# Patient Record
Sex: MALE | Race: WHITE
[De-identification: names, ages, dates, MRNs, and addresses within clinical notes are randomized per-mention and may not be internally consistent; named-entity substitution may affect disease eponyms.]

---

## 2017-12-14 ENCOUNTER — HOSPITAL ENCOUNTER (EMERGENCY)
Dept: HOSPITAL 74 - FER | Age: 62
Discharge: HOME | End: 2017-12-14
Payer: COMMERCIAL

## 2017-12-14 VITALS — BODY MASS INDEX: 33.7 KG/M2

## 2017-12-14 VITALS — SYSTOLIC BLOOD PRESSURE: 146 MMHG | DIASTOLIC BLOOD PRESSURE: 83 MMHG | HEART RATE: 73 BPM | TEMPERATURE: 98.6 F

## 2017-12-14 DIAGNOSIS — M75.92: Primary | ICD-10-CM

## 2017-12-14 PROCEDURE — 3E0233Z INTRODUCTION OF ANTI-INFLAMMATORY INTO MUSCLE, PERCUTANEOUS APPROACH: ICD-10-PCS

## 2017-12-14 NOTE — PDOC
History of Present Illness





<Will Berman - Last Filed: 12/14/17 18:28>





- General


History Source: Patient


Exam Limitations: No Limitations





- History of Present Illness


Initial Comments: 





12/14/17 19:09


62 year old RHD male with no significant PMH, who presents to the emergency 

room complaining of 4 days of progressively worsening right shoulder pain that 

radiates to the right side of the neck. The shoulder pain is preventing him 

from sleeping at night . The neck pain is exacerbated when nodding his head. 

The patient states that he visited his PCP earlier in the week who prescribed 

ibuprofen 800mg, percocet, a muscle relaxer, and a shot in the lower back which 

provided no relief. The patient denies any recent heavy lifting or recent 

trauma. 





<Patty Brown - Last Filed: 12/14/17 19:11>





- General


Chief Complaint: Pain, Acute


Stated Complaint: NECK,BACK AND LEFT ARM PAIN X 4 DAYS


Time Seen by Provider: 12/14/17 18:21





Past History





- Past Medical History


COPD: No


Kidney Stones: Yes





- Suicide/Smoking/Psychosocial Hx


Smoking History: Never smoked


Hx Alcohol Use: No


Drug/Substance Use Hx: No


Substance Use Type: None





<Will Berman - Last Filed: 12/14/17 18:28>





<Patty Brown - Last Filed: 12/14/17 19:11>





- Past Medical History


Allergies/Adverse Reactions: 


 Allergies











Allergy/AdvReac Type Severity Reaction Status Date / Time


 


No Known Allergies Allergy   Verified 12/14/17 18:17











Home Medications: 


Ambulatory Orders





Ibuprofen 800 mg PO ASDIR 12/14/17 


Oxycodone HCl/Acetaminophen [Percocet 5-325 mg Tablet] 2 tab PO Q6H 12/14/17 











**Review of Systems





- Review of Systems


Comments:: 





12/14/17 19:10


CONSTITUTIONAL:


Absent: fever, no chills, no fatigue


EYES:


Absent: visual changes


ENT:


Absent: ear pain, no sore throat


CARDIOVASCULAR:


Absent: chest pain, no palpitations


RESPIRATORY:


Absent: cough, no SOB


GI:


Absent: abdominal pain, no nausea, no vomiting, no constipation, no diarrhea


GENITOURINARY:


Absent: dysuria, no frequency, no hematuria


MUSCULOSKELETAL:


+left neck pain, left shoulder pain


SKIN:


Absent: rash








<Patty Brown - Last Filed: 12/14/17 19:11>





*Physical Exam





- Vital Signs


 Last Vital Signs











Temp Pulse Resp BP Pulse Ox


 


 98.6 F   73   18   146/83   97 


 


 12/14/17 18:17  12/14/17 18:17  12/14/17 18:17  12/14/17 18:17  12/14/17 18:17














<Will Berman - Last Filed: 12/14/17 18:28>





- Vital Signs


 Last Vital Signs











Temp Pulse Resp BP Pulse Ox


 


 98.6 F   73   18   146/83   97 


 


 12/14/17 18:17  12/14/17 18:17  12/14/17 18:17  12/14/17 18:17  12/14/17 18:17














- Physical Exam


Comments: 





12/14/17 19:10





GENERAL: 


Well-appearing, well-nourished. No apparent distress.


HEENT: 


Normocephalic, atraumatic. PERRL, EOM intact.


CARDIOVASCULAR: 


Normal S1, S2. Regular rate and rhythm.


PULMONARY: 


Clear to auscultation bilaterally.


ABDOMEN: 


Soft, non-distended, non-tender. 


EXTREMITIES: 


+there is pain in the left shoulder with active flexion and compression. There 

is tenderness along the C-spine. Normal ROM in all four extremities. No gross 

deformities.


SKIN: 


Warm, dry. No rash


NEUROLOGICAL: 


No focal neurological deficits.








<Patty Brown - Last Filed: 12/14/17 19:11>





*DC/Admit/Observation/Transfer





<Will Berman - Last Filed: 12/14/17 18:28>





- Attestations


Scribe Attestion: 





12/14/17 19:11





Documentation prepared by DEMETRIO Jordan, acting as medical scribe 

for Will Berman MD.





<Patty Brown - Last Filed: 12/14/17 19:11>





- Discharge Dispostion


Condition at time of disposition: Stable

## 2017-12-14 NOTE — PDOC
*Physical Exam





- Vital Signs


 Last Vital Signs











Temp Pulse Resp BP Pulse Ox


 


 98.6 F   73   18   146/83   97 


 


 12/14/17 18:17  12/14/17 18:17  12/14/17 18:17  12/14/17 18:17  12/14/17 18:17














Progress Note





- Progress Note


Progress Note: 





Care of this patient received from Dr. Berman.








X-rays of cervical spine/left shoulder/left elbow revealed straightening of the 

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  No 

acute fracture/dislocation.





Results discussed with the patient and his wife.  They state that the patient 

has had little relief with medications prescribed by his PMD.


Patient will be given Toradol 60 mg IM now.  Prescriptions for diclofenac 75 mg 

twice a day as needed; patient will also be started on tizanidine 2 mg up to 3 

times a day as needed for muscle spasm.





Since patient does not have an orthopedist, he will be referred to Dr. John/Dr. Ashley.  Referral information will be given to the patient and follow-up 

should be within the next 5 days.





*DC/Admit/Observation/Transfer


Diagnosis at time of Disposition: 


 Left shoulder tendinitis





Cervical strain, acute


Qualifiers:


 Encounter type: initial encounter Qualified Code(s): S16.1XXA - Strain of 

muscle, fascia and tendon at neck level, initial encounter








- Discharge Dispostion


Disposition: HOME


Condition at time of disposition: Stable





- Prescriptions


Prescriptions: 


Diclofenac Sodium [Voltaren -] 75 mg PO BID PRN #20 tablet.


 PRN Reason: Pain


Tizanidine HCl 2 mg PO TID PRN #12 tablet


 PRN Reason: Muscle Spasms





- Referrals


Referrals: 


Best John MD [Staff Physician] - Call tomorrow





- Patient Instructions


Printed Discharge Instructions:  DI for Shoulder Tendinopathy


Additional Instructions: 


Stop ibuprofen and previously prescribed muscle relaxant 


Diclofenac 75 mg twice a day; take with food


Warm compresses to areas of muscle spasm


Tizanidine 2 mg up to 3 times a day for muscle spasms


Percocet as needed for severe pain as previously prescribed


Follow-up within the next 5 days with orthopedist (Gee John/Dion)


Call the orthopedic office in the morning to make an appointment





- Post Discharge Activity

## 2022-05-23 ENCOUNTER — HOSPITAL ENCOUNTER (EMERGENCY)
Dept: HOSPITAL 74 - JER | Age: 67
Discharge: HOME | End: 2022-05-23
Payer: COMMERCIAL

## 2022-05-23 VITALS — TEMPERATURE: 98.2 F | DIASTOLIC BLOOD PRESSURE: 99 MMHG | HEART RATE: 81 BPM | SYSTOLIC BLOOD PRESSURE: 162 MMHG

## 2022-05-23 VITALS — BODY MASS INDEX: 36.8 KG/M2

## 2022-05-23 DIAGNOSIS — U07.1: Primary | ICD-10-CM

## 2022-05-23 PROCEDURE — M0222: HCPCS

## 2022-05-23 PROCEDURE — 3E033GC INTRODUCTION OF OTHER THERAPEUTIC SUBSTANCE INTO PERIPHERAL VEIN, PERCUTANEOUS APPROACH: ICD-10-PCS
